# Patient Record
Sex: FEMALE
[De-identification: names, ages, dates, MRNs, and addresses within clinical notes are randomized per-mention and may not be internally consistent; named-entity substitution may affect disease eponyms.]

---

## 2023-05-10 PROBLEM — Z00.00 ENCOUNTER FOR PREVENTIVE HEALTH EXAMINATION: Status: ACTIVE | Noted: 2023-05-10

## 2023-07-06 ENCOUNTER — APPOINTMENT (OUTPATIENT)
Dept: NEUROSURGERY | Facility: CLINIC | Age: 33
End: 2023-07-06
Payer: SELF-PAY

## 2023-07-06 DIAGNOSIS — G96.00 CEREBROSPINAL FLUID LEAK, UNSPECIFIED: ICD-10-CM

## 2023-07-06 PROCEDURE — EDU01: CPT

## 2023-07-06 NOTE — REASON FOR VISIT
[Home] : at home, [unfilled] , at the time of the educational consult. [Medical Office: ([facility])___] : at the medical office located in  [Participant] : the participant [Self] : self [New Patient Visit] : a new patient visit

## 2023-07-07 NOTE — HISTORY OF PRESENT ILLNESS
[de-identified] : Ms. Lebron is a pleasant 31yo female who presents today with a chief complaint of CSF Leak.  She developed a spinal CSF leak last year shortly after giving birth.  She has had 3 blood patches which have improved symptoms a little bit.  She is also a patient at [facility].  She states that she still has occasionally leaking but more recently she has been having some high pressure symptoms and requested an MRV which she would like to discuss further. \par \par She states she has intermittent headaches when she wakes up and left pulsatile tinnitus.

## 2023-07-07 NOTE — ASSESSMENT
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 Lebron that based on her MRV, I do not see a cause of elevated intracranial pressure on her imaging.  The hypoplastic left side is a normal anatomic variant.  Her local MDs recommended a follow up CTV which she is scheduled for in 2 weeks.  I recommend MRA Head TRICKS Protocol to rule out an arterial cause of her pulsatile tinnitus. \par \par Recommendations:\par MRA Head w/wo TRICKS Protocol\par Upload CTV Once Complete\par Follow Up with Me After the Above